# Patient Record
Sex: FEMALE | Race: BLACK OR AFRICAN AMERICAN | NOT HISPANIC OR LATINO | Employment: OTHER | URBAN - METROPOLITAN AREA
[De-identification: names, ages, dates, MRNs, and addresses within clinical notes are randomized per-mention and may not be internally consistent; named-entity substitution may affect disease eponyms.]

---

## 2018-08-07 ENCOUNTER — TELEPHONE (OUTPATIENT)
Dept: ENDOCRINOLOGY | Facility: CLINIC | Age: 43
End: 2018-08-07

## 2018-08-07 NOTE — TELEPHONE ENCOUNTER
----- Message from Seda Alba sent at 8/7/2018 10:04 AM CDT -----  Contact: PT  PT is calling requesting appointment to be moved up sooner than October    Callback: [phone]

## 2018-08-13 ENCOUNTER — INITIAL CONSULT (OUTPATIENT)
Dept: ENDOCRINOLOGY | Facility: CLINIC | Age: 43
End: 2018-08-13
Payer: OTHER GOVERNMENT

## 2018-08-13 ENCOUNTER — TELEPHONE (OUTPATIENT)
Dept: ENDOCRINOLOGY | Facility: CLINIC | Age: 43
End: 2018-08-13

## 2018-08-13 ENCOUNTER — LAB VISIT (OUTPATIENT)
Dept: LAB | Facility: HOSPITAL | Age: 43
End: 2018-08-13
Attending: INTERNAL MEDICINE
Payer: OTHER GOVERNMENT

## 2018-08-13 VITALS
DIASTOLIC BLOOD PRESSURE: 84 MMHG | TEMPERATURE: 97 F | BODY MASS INDEX: 28.94 KG/M2 | HEART RATE: 72 BPM | SYSTOLIC BLOOD PRESSURE: 120 MMHG | WEIGHT: 190.94 LBS | HEIGHT: 68 IN

## 2018-08-13 DIAGNOSIS — E22.1 HYPERPROLACTINEMIA: ICD-10-CM

## 2018-08-13 DIAGNOSIS — D64.9 ANEMIA, UNSPECIFIED TYPE: ICD-10-CM

## 2018-08-13 DIAGNOSIS — E04.2 MULTIPLE THYROID NODULES: Primary | ICD-10-CM

## 2018-08-13 DIAGNOSIS — R53.83 FATIGUE, UNSPECIFIED TYPE: ICD-10-CM

## 2018-08-13 DIAGNOSIS — R63.5 WEIGHT GAIN: ICD-10-CM

## 2018-08-13 DIAGNOSIS — R79.89 LOW SERUM ADRENOCORTICOTROPHIC HORMONE (ACTH): ICD-10-CM

## 2018-08-13 DIAGNOSIS — E04.2 MULTIPLE THYROID NODULES: ICD-10-CM

## 2018-08-13 LAB
ESTIMATED AVG GLUCOSE: 128 MG/DL
FERRITIN SERPL-MCNC: 7 NG/ML
HBA1C MFR BLD HPLC: 6.1 %
IRON SERPL-MCNC: 18 UG/DL
PROLACTIN SERPL IA-MCNC: 26.3 NG/ML
SATURATED IRON: 4 %
T3FREE SERPL-MCNC: 2.7 PG/ML
T4 FREE SERPL-MCNC: 0.88 NG/DL
TOTAL IRON BINDING CAPACITY: 451 UG/DL
TRANSFERRIN SERPL-MCNC: 305 MG/DL
TSH SERPL DL<=0.005 MIU/L-ACNC: 1.01 UIU/ML

## 2018-08-13 PROCEDURE — 99213 OFFICE O/P EST LOW 20 MIN: CPT | Mod: PBBFAC,PO | Performed by: INTERNAL MEDICINE

## 2018-08-13 PROCEDURE — 84146 ASSAY OF PROLACTIN: CPT

## 2018-08-13 PROCEDURE — 86376 MICROSOMAL ANTIBODY EACH: CPT

## 2018-08-13 PROCEDURE — 82024 ASSAY OF ACTH: CPT

## 2018-08-13 PROCEDURE — 83540 ASSAY OF IRON: CPT

## 2018-08-13 PROCEDURE — 99999 PR PBB SHADOW E&M-EST. PATIENT-LVL III: CPT | Mod: PBBFAC,,, | Performed by: INTERNAL MEDICINE

## 2018-08-13 PROCEDURE — 83036 HEMOGLOBIN GLYCOSYLATED A1C: CPT

## 2018-08-13 PROCEDURE — 84481 FREE ASSAY (FT-3): CPT

## 2018-08-13 PROCEDURE — 84439 ASSAY OF FREE THYROXINE: CPT

## 2018-08-13 PROCEDURE — 99204 OFFICE O/P NEW MOD 45 MIN: CPT | Mod: S$PBB,,, | Performed by: INTERNAL MEDICINE

## 2018-08-13 PROCEDURE — 86800 THYROGLOBULIN ANTIBODY: CPT

## 2018-08-13 PROCEDURE — 36415 COLL VENOUS BLD VENIPUNCTURE: CPT | Mod: PO

## 2018-08-13 PROCEDURE — 84443 ASSAY THYROID STIM HORMONE: CPT

## 2018-08-13 PROCEDURE — 82728 ASSAY OF FERRITIN: CPT

## 2018-08-13 RX ORDER — RABEPRAZOLE SODIUM 20 MG/1
20 TABLET, DELAYED RELEASE ORAL 2 TIMES DAILY
COMMUNITY

## 2018-08-13 RX ORDER — CLONAZEPAM 0.5 MG/1
0.5 TABLET ORAL NIGHTLY
COMMUNITY

## 2018-08-13 RX ORDER — VENLAFAXINE HYDROCHLORIDE 75 MG/1
75 CAPSULE, EXTENDED RELEASE ORAL DAILY
COMMUNITY

## 2018-08-13 NOTE — TELEPHONE ENCOUNTER
Faxed to PCP per MD order: fax my note to referring provider and please request resulted from ACTH level that was done by her PCP. Confirmation received.

## 2018-08-13 NOTE — PROGRESS NOTES
""This note will be shared with the patient"Subjective:       Patient ID: Theresa Doherty is a 43 y.o. female.  Patient is new to me    Records were reviewed      Chief Complaint: Thyroid Nodule and Hyperprolactinemia      Consultation requested by Dr. Bill Jack    HPI  Patient was referred by her PCP for multiple concerns.    She was noted to have issues with weight gain and fatigue and anxiety and depression and also muscle aches and hair loss.  She states she has been having depression for years and is on an antidepressant    She does state her mother noticed increase fatty tissue in upper back area    Menstrual cycles sometimes heavy- has  Fibroids, may miss 2 out of year    There was a concern for central hypothyroidism and subclinical hypothyroidism as her TSH was normal to low normal with a low to low-normal free T4-records are under media section.  Also a concern for hypopituitarism as her ACTH level was low.  She had a normal random cortisol level at 12.7 normal IGF-1 but her prolactin was mildly elevated.  Her LH to FSH ratio was elevated.  In April she was noted to have anemia with elevated platelet count; her testosterone level was normal    She brought in a copy of her MRI of pituitary done June 19, 2018 at the [facility] in [place], [place] and showed an unremarkable pituitary gland and she also brought in a thyroid ultrasound that shows a stable gland compared to an ultrasound done January 2017 that shows bilateral solid and cystic thyroid nodules ranging up to 14 mm on the right side and up to 9 mm on the left side    Her mother had a partial thyroidectomy for thyroid nodules    No family history of thyroid cancer    Patient had been tried on Synthroid  Denies galactorrhea  I have reviewed the past medical, family and social history    Review of Systems   Constitutional: Positive for fatigue. Negative for appetite change, fever and unexpected weight change.   HENT: Negative " for sore throat and trouble swallowing.    Eyes: Negative for visual disturbance.   Respiratory: Positive for shortness of breath. Negative for wheezing.    Cardiovascular: Negative for chest pain, palpitations and leg swelling.   Gastrointestinal: Negative for diarrhea, nausea and vomiting.   Endocrine: Negative for cold intolerance, heat intolerance, polydipsia, polyphagia and polyuria.   Genitourinary: Negative for difficulty urinating, dysuria and menstrual problem.   Musculoskeletal: Negative for arthralgias and joint swelling.   Skin: Negative for rash.   Neurological: Negative for dizziness, weakness, numbness and headaches.   Psychiatric/Behavioral: Positive for dysphoric mood. Negative for confusion and sleep disturbance. The patient is nervous/anxious.        Objective:      Physical Exam   Constitutional: She is oriented to person, place, and time. She appears well-developed and well-nourished. No distress.   HENT:   Head: Normocephalic and atraumatic.   Right Ear: External ear normal.   Left Ear: External ear normal.   Nose: Nose normal.   Mouth/Throat: Oropharynx is clear and moist. No oropharyngeal exudate.   Eyes: Conjunctivae and EOM are normal. Pupils are equal, round, and reactive to light. No scleral icterus.   Neck: No JVD present. No tracheal deviation present. Thyromegaly present.   Cardiovascular: Normal rate, regular rhythm, normal heart sounds and intact distal pulses. Exam reveals no gallop and no friction rub.   No murmur heard.  Pulmonary/Chest: Effort normal and breath sounds normal. No respiratory distress. She has no wheezes. She has no rales.   Abdominal: Soft. Bowel sounds are normal. She exhibits no distension and no mass. There is no tenderness. There is no rebound and no guarding. No hernia.   Musculoskeletal: She exhibits no edema or deformity.   Lymphadenopathy:     She has no cervical adenopathy.   Neurological: She is alert and oriented to person, place, and time. She has  normal reflexes. No cranial nerve deficit.   Skin: Skin is warm. No rash noted. She is not diaphoretic. No erythema.   Possible supraclavicular and dorsocervical fatty pads?   Psychiatric: She has a normal mood and affect. Her behavior is normal.   Vitals reviewed.        Lab Review:   No results found for any previous visit.       Assessment:     1. Multiple thyroid nodules  T3, free    T4, free    TSH    Thyroid peroxidase antibody    Anti-thyroglobulin antibody   2. Hyperprolactinemia  Prolactin   3. Low serum adrenocorticotrophic hormone (ACTH)  ACTH    Salivary Cortisol X2, Timed    Salivary Cortisol X2, Timed   4. Weight gain  Creatinine, urine, timed 24 Hours    Cortisol, urine, free    Hemoglobin A1c    Salivary Cortisol X2, Timed    Salivary Cortisol X2, Timed   5. Fatigue, unspecified type  Creatinine, urine, timed 24 Hours    Cortisol, urine, free    Hemoglobin A1c    Salivary Cortisol X2, Timed    Salivary Cortisol X2, Timed   6. Anemia, unspecified type  IRON AND TIBC    FERRITIN    due to her fatigue and possible fatty pads on physical exam and weight gain I will rule out Cushing's with 24 urine cortisol and check salivary cortisol samples x2 and a repeat ACTH level    Her prolactin level was mildly elevated though her pituitary gland was normal on MRI.  She does have somewhat irregular menstrual cycles but Testosterone was normal and no hirsutism as elevated prolactin sometimes can be associated with PCOS.  I will repeat levels    I will repeat her thyroid function tests and check thyroid antibodies since her thyroid ultrasound did mention heterogenous gland which is seen in autoimmune thyroid disease or Hashimoto's.  Since her nodules are stable and since she is asymptomatic I will continue to monitor these    She was significantly anemic and this may be a source of her tiredness and fatigue as well as hair loss and even dyspnea though she states that she has had a chest x-ray and her PCP was  concerned about sarcoidosis so I will repeat her CBC and check iron stores  Plan:   Multiple thyroid nodules  -     T3, free; Future; Expected date: 08/13/2018  -     T4, free; Future; Expected date: 08/13/2018  -     TSH; Future; Expected date: 08/13/2018  -     Thyroid peroxidase antibody; Future; Expected date: 08/13/2018  -     Anti-thyroglobulin antibody; Future; Expected date: 08/13/2018    Hyperprolactinemia  -     Prolactin; Future; Expected date: 08/13/2018    Low serum adrenocorticotrophic hormone (ACTH)  -     ACTH; Future; Expected date: 08/13/2018  -     Salivary Cortisol X2, Timed; Future; Expected date: 08/13/2018    Weight gain  -     Creatinine, urine, timed 24 Hours; Future  -     Cortisol, urine, free; Future  -     Hemoglobin A1c; Future; Expected date: 08/13/2018  -     Salivary Cortisol X2, Timed; Future; Expected date: 08/13/2018    Fatigue, unspecified type  -     Creatinine, urine, timed 24 Hours; Future  -     Cortisol, urine, free; Future  -     Hemoglobin A1c; Future; Expected date: 08/13/2018  -     Salivary Cortisol X2, Timed; Future; Expected date: 08/13/2018    Anemia, unspecified type  -     IRON AND TIBC; Future; Expected date: 08/13/2018  -     FERRITIN; Future; Expected date: 08/13/2018        Thank you to Dr. Bill Jack for this consultation request

## 2018-08-13 NOTE — TELEPHONE ENCOUNTER
----- Message from Rosalinda Stanley MD sent at 8/13/2018 10:22 AM CDT -----  Can fax my note to referring provider and please request resulted from ACTH level that was done by her PCP

## 2018-08-13 NOTE — LETTER
August 13, 2018      Bill Jack MD  [address]  [place] [address]           [facility] - Endocrin[facility]  [address]  [address]  [place] [address]  Phone: [phone]  Fax: [phone]          Patient: Theresa Doherty   MR Number: [id number]   YOB: 1975   Date of Visit: 8/13/2018       Dear Dr. Bill Jack:    Thank you for referring Theresa Doherty to me for evaluation. Attached you will find relevant portions of my assessment and plan of care.    If you have questions, please do not hesitate to call me. I look forward to following Theresa Doherty along with you.    Sincerely,    Rosalinda Stanley MD    Enclosure  CC:  No Recipients    If you would like to receive this communication electronically, please contact [email or web address] or [phone] to request more information on [email or web address] Link access.    For providers and/or their staff who would like to refer a patient to [facility], please contact us through our one-stop-shop provider referral line, [facility], at [phone].    If you feel you have received this communication in error or would no longer like to receive these types of communications, please e-mail [email or web address]

## 2018-08-13 NOTE — Clinical Note
Can fax my note to referring provider and please request resulted from ACTH level that was done by her PCP

## 2018-08-14 LAB
ACTH PLAS-MCNC: 43 PG/ML
THYROGLOB AB SERPL IA-ACNC: <4 IU/ML
THYROPEROXIDASE IGG SERPL-ACNC: <6 IU/ML

## 2018-08-21 ENCOUNTER — LAB VISIT (OUTPATIENT)
Dept: LAB | Facility: HOSPITAL | Age: 43
End: 2018-08-21
Attending: INTERNAL MEDICINE
Payer: OTHER GOVERNMENT

## 2018-08-21 DIAGNOSIS — R53.83 FATIGUE, UNSPECIFIED TYPE: ICD-10-CM

## 2018-08-21 DIAGNOSIS — R63.5 WEIGHT GAIN: Primary | ICD-10-CM

## 2018-08-21 DIAGNOSIS — R63.5 WEIGHT GAIN: ICD-10-CM

## 2018-08-21 LAB
CREAT 24H UR-MRATE: 53.9 MG/HR
CREAT UR-MCNC: 110 MG/DL
CREATININE, URINE (MG/SPEC): 1292.5 MG/SPEC
URINE COLLECTION DURATION: 24 HR
URINE VOLUME: 1175 ML

## 2018-08-21 PROCEDURE — 82530 CORTISOL FREE: CPT

## 2018-08-21 PROCEDURE — 82570 ASSAY OF URINE CREATININE: CPT

## 2018-08-22 ENCOUNTER — LAB VISIT (OUTPATIENT)
Dept: LAB | Facility: HOSPITAL | Age: 43
End: 2018-08-22
Attending: INTERNAL MEDICINE
Payer: OTHER GOVERNMENT

## 2018-08-22 DIAGNOSIS — R53.83 FATIGUE, UNSPECIFIED TYPE: ICD-10-CM

## 2018-08-22 DIAGNOSIS — R63.5 WEIGHT GAIN: ICD-10-CM

## 2018-08-22 PROCEDURE — 82533 TOTAL CORTISOL: CPT | Mod: 91

## 2018-08-23 LAB
COLLECT DURATION TIME UR: 24 H
CORTIS 24H UR-MRATE: 14 MCG/24 H (ref 3.5–45)
SPECIMEN VOL ?TM UR: 1175 ML

## 2018-08-25 ENCOUNTER — PATIENT MESSAGE (OUTPATIENT)
Dept: ENDOCRINOLOGY | Facility: CLINIC | Age: 43
End: 2018-08-25

## 2018-08-25 RX ORDER — METFORMIN HYDROCHLORIDE 500 MG/1
TABLET, EXTENDED RELEASE ORAL
Qty: 60 TABLET | Refills: 3 | Status: SHIPPED | OUTPATIENT
Start: 2018-08-25

## 2018-08-27 ENCOUNTER — TELEPHONE (OUTPATIENT)
Dept: ENDOCRINOLOGY | Facility: CLINIC | Age: 43
End: 2018-08-27

## 2018-08-28 LAB
CORTIS 12 AM SAL-MCNC: NORMAL NG/ML
CORTIS 12 AM SAL-MCNC: NORMAL NG/ML
CORTIS 8 AM SAL-MCNC: NORMAL NG/ML
CORTIS 8 AM SAL-MCNC: NORMAL NG/ML
CORTIS 8 PM SAL-MCNC: NORMAL NG/ML
CORTIS 8 PM SAL-MCNC: NORMAL NG/ML
CORTIS SAL-MCNC: <50 NG/DL
CORTIS SAL-MCNC: <50 NG/DL

## 2018-08-29 ENCOUNTER — PATIENT MESSAGE (OUTPATIENT)
Dept: ENDOCRINOLOGY | Facility: CLINIC | Age: 43
End: 2018-08-29